# Patient Record
Sex: FEMALE | Race: WHITE | NOT HISPANIC OR LATINO | URBAN - METROPOLITAN AREA
[De-identification: names, ages, dates, MRNs, and addresses within clinical notes are randomized per-mention and may not be internally consistent; named-entity substitution may affect disease eponyms.]

---
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
## 2024-02-22 ENCOUNTER — TELEPHONE (OUTPATIENT)
Dept: OBSTETRICS AND GYNECOLOGY | Facility: CLINIC | Age: 19
End: 2024-02-22
Payer: COMMERCIAL

## 2024-03-01 ENCOUNTER — APPOINTMENT (OUTPATIENT)
Dept: NEUROLOGY | Facility: HOSPITAL | Age: 19
End: 2024-03-01
Payer: COMMERCIAL

## 2024-03-08 ENCOUNTER — TELEPHONE (OUTPATIENT)
Dept: PEDIATRICS | Facility: CLINIC | Age: 19
End: 2024-03-08
Payer: COMMERCIAL

## 2024-03-08 DIAGNOSIS — H10.30 ACUTE BACTERIAL CONJUNCTIVITIS, UNSPECIFIED LATERALITY: Primary | ICD-10-CM

## 2024-03-08 RX ORDER — CIPROFLOXACIN HYDROCHLORIDE 3 MG/ML
SOLUTION/ DROPS OPHTHALMIC
Qty: 5 ML | Refills: 0 | Status: SHIPPED | OUTPATIENT
Start: 2024-03-08

## 2024-08-05 ENCOUNTER — APPOINTMENT (OUTPATIENT)
Dept: OBSTETRICS AND GYNECOLOGY | Facility: CLINIC | Age: 19
End: 2024-08-05
Payer: COMMERCIAL

## 2024-08-21 ENCOUNTER — APPOINTMENT (OUTPATIENT)
Dept: OBSTETRICS AND GYNECOLOGY | Facility: CLINIC | Age: 19
End: 2024-08-21
Payer: COMMERCIAL

## 2024-08-21 VITALS
HEIGHT: 62 IN | WEIGHT: 119.6 LBS | SYSTOLIC BLOOD PRESSURE: 112 MMHG | DIASTOLIC BLOOD PRESSURE: 68 MMHG | BODY MASS INDEX: 22.01 KG/M2

## 2024-08-21 DIAGNOSIS — Z11.3 SCREENING EXAMINATION FOR STD (SEXUALLY TRANSMITTED DISEASE): ICD-10-CM

## 2024-08-21 DIAGNOSIS — Z01.419 VISIT FOR PELVIC EXAM: Primary | ICD-10-CM

## 2024-08-21 DIAGNOSIS — Z12.4 CERVICAL CANCER SCREENING: ICD-10-CM

## 2024-08-21 DIAGNOSIS — Z30.431 IUD CHECK UP: ICD-10-CM

## 2024-08-21 PROCEDURE — 1036F TOBACCO NON-USER: CPT | Performed by: OBSTETRICS & GYNECOLOGY

## 2024-08-21 PROCEDURE — 99395 PREV VISIT EST AGE 18-39: CPT | Performed by: OBSTETRICS & GYNECOLOGY

## 2024-08-21 PROCEDURE — 3008F BODY MASS INDEX DOCD: CPT | Performed by: OBSTETRICS & GYNECOLOGY

## 2024-08-21 ASSESSMENT — ENCOUNTER SYMPTOMS
CONSTITUTIONAL NEGATIVE: 1
GASTROINTESTINAL NEGATIVE: 1
PSYCHIATRIC NEGATIVE: 1
MUSCULOSKELETAL NEGATIVE: 1
CARDIOVASCULAR NEGATIVE: 1
RESPIRATORY NEGATIVE: 1
NEUROLOGICAL NEGATIVE: 1

## 2024-08-21 NOTE — PROGRESS NOTES
"Subjective   Madelin Banks is a 19 y.o. female who is here for a routine exam. Periods are regular every 28-30 days, lasting 7 days. Dysmenorrhea:none. Cyclic symptoms include none. No intermenstrual bleeding, spotting, or discharge. Patient denies dyspareunia.    Current contraception: IUD  History of abnormal Pap smear: no  Family history of uterine or ovarian cancer: no  Regular self breast exam: yes  History of abnormal mammogram: no  Family history of breast cancer: yes - maternal and paternal grandmothers, maternal aunts  History of abnormal lipids: no  Menstrual History:  OB History          0    Para   0    Term   0       0    AB   0    Living   0         SAB   0    IAB   0    Ectopic   0    Multiple   0    Live Births   0                  Patient's last menstrual period was 2024 (exact date).         Review of Systems   Constitutional: Negative.    Respiratory: Negative.     Cardiovascular: Negative.    Gastrointestinal: Negative.    Genitourinary: Negative.    Musculoskeletal: Negative.    Skin: Negative.    Neurological: Negative.    Psychiatric/Behavioral: Negative.         Objective   /68   Ht 1.575 m (5' 2\")   Wt 54.3 kg (119 lb 9.6 oz)   LMP 2024 (Exact Date)   BMI 21.88 kg/m²     General:   alert, appears stated age, and cooperative   Heart: regular rate and rhythm, S1, S2 normal, no murmur, click, rub or gallop   Lungs: clear to auscultation bilaterally   Abdomen: soft, non-tender, without masses or organomegaly   Pelvic: Deferred   Breast: No masses, skin changes, discoloration, tenderness, or nipple drainage bilaterally     Neck: Normal ROM. Thyroid normal size. No masses/tenderness     Lymph: No LAD   Psych: Normal mood/affect     Assessment/Plan   Problem List Items Addressed This Visit    None  Visit Diagnoses       Visit for pelvic exam    -  Primary    Cervical cancer screening        Screening examination for STD (sexually transmitted disease)        " IUD check up              1. Pelvic/breast exam wnl, counseled on breast awareness/self exam  2. Pap starting at 21.  3. IUD in place without complication  4. Counseled on safe sex practices including condom use. Offered screening for GN/CT, HIV, Syphilis. Patient declines    RTO 1 year  Jannie Zarate, DO

## 2024-08-21 NOTE — PATIENT INSTRUCTIONS
Routine Gynecologic Visit:  You were seen today for a routine gyn visit with normal findings on exam  You were not due for a pap smear today. You should still continue to get annual breast and pelvic exams in the office.  Continue self breast exams/monitoring at home and call for appointment in the office if there are ever masses, skin discoloration, dimpling/puckering of the skin, or nipple drainage when you are not pregnant  We discussed contraception today and your IUD is in the correct position. Continue to use condoms with any new partners to protect against STD's and have routine STD screening done at your gynecologic visits if you have had a new partner in the last year or have new symptoms of pelvic pain, discharge, vulvar rashes. STD screening was not done today  If you are having any gynecologic issues in the next year you should call the office. [phone] ([place]) or [phone] ([place])

## 2024-12-17 ENCOUNTER — APPOINTMENT (OUTPATIENT)
Dept: PEDIATRICS | Facility: CLINIC | Age: 19
End: 2024-12-17
Payer: COMMERCIAL

## 2024-12-17 VITALS
HEIGHT: 62 IN | WEIGHT: 115.1 LBS | SYSTOLIC BLOOD PRESSURE: 93 MMHG | HEART RATE: 75 BPM | DIASTOLIC BLOOD PRESSURE: 61 MMHG | BODY MASS INDEX: 21.18 KG/M2

## 2024-12-17 DIAGNOSIS — Z00.129 HEALTH CHECK FOR CHILD OVER 28 DAYS OLD: Primary | ICD-10-CM

## 2024-12-17 PROCEDURE — 3008F BODY MASS INDEX DOCD: CPT | Performed by: PEDIATRICS

## 2024-12-17 PROCEDURE — 90715 TDAP VACCINE 7 YRS/> IM: CPT | Performed by: PEDIATRICS

## 2024-12-17 PROCEDURE — 99395 PREV VISIT EST AGE 18-39: CPT | Performed by: PEDIATRICS

## 2024-12-17 PROCEDURE — 90471 IMMUNIZATION ADMIN: CPT | Performed by: PEDIATRICS

## 2024-12-17 SDOH — HEALTH STABILITY: MENTAL HEALTH: SMOKING IN HOME: 0

## 2024-12-17 ASSESSMENT — ENCOUNTER SYMPTOMS
SLEEP DISTURBANCE: 0
CONSTIPATION: 0

## 2024-12-17 NOTE — PROGRESS NOTES
Subjective   History was provided by the  patient .  Madelin Banks is a 19 y.o. female who is here for this well child visit.  Immunization History   Administered Date(s) Administered    DTaP vaccine, pediatric  (INFANRIX) 2004, 2004, 2004, 06/07/2005, 02/06/2009    Flu vaccine (IIV4), preservative free *Check age/dose* 11/24/2015, 11/07/2016, 10/10/2018, 11/03/2019, 11/17/2023    Flu vaccine, quadrivalent, no egg protein, age 6 month or greater (FLUCELVAX) 10/18/2021, 10/15/2022    HPV 9-valent vaccine (GARDASIL 9) 10/10/2018, 07/10/2019    Hepatitis A vaccine, pediatric/adolescent (HAVRIX, VAQTA) 07/10/2019    Hepatitis B vaccine, adult *Check Product/Dose* 2004, 2004, 2004    HiB PRP-OMP conjugate vaccine, pediatric (PEDVAXHIB) 2004, 2004, 2004, 06/07/2005    Influenza, Unspecified 2004, 2004, 10/26/2005, 11/21/2006, 10/22/2007, 11/08/2008    Influenza, live, intranasal, quadrivalent 09/08/2009, 10/23/2010, 09/24/2011, 11/02/2012, 10/26/2013, 10/25/2014    Influenza, seasonal, injectable 02/02/2018    MMR vaccine, subcutaneous (MMR II) 03/04/2005, 02/06/2009    Meningococcal ACWY vaccine (MENVEO) 07/22/2020    Meningococcal ACWY-D (Menactra) 4-valent conjugate vaccine 04/09/2015    Meningococcal B vaccine (BEXSERO) 07/22/2020, 07/29/2021    Novel Influenza-H1N1-09, nasal 11/20/2009, 12/22/2009    Pfizer COVID-19 vaccine, 12 years and older, (30mcg/0.3mL) (Comirnaty) 11/17/2023    Pfizer COVID-19 vaccine, bivalent, age 12 years and older (30 mcg/0.3 mL) 10/15/2022    Pfizer Gray Cap SARS-CoV-2 03/26/2022    Pfizer Purple Cap SARS-CoV-2 03/25/2021, 04/14/2021    Pneumococcal Conjugate PCV 7 2004, 2004, 2004, 06/07/2005    Poliovirus vaccine, subcutaneous (IPOL) 2004, 2004, 09/13/2005, 02/06/2009    Tdap vaccine, age 7 year and older (BOOSTRIX, ADACEL) 04/09/2015    Varicella vaccine, subcutaneous (VARIVAX) 06/07/2005,  02/06/2009     History of previous adverse reactions to immunizations? no  The following portions of the patient's history were reviewed by a provider in this encounter and updated as appropriate:       Well Child Assessment:  History provided by: patient. Madelin lives with her mother, father and brother. (psoriasis- using a topical foam- unsure of the name)     Nutrition  Food source: varied.   Dental  The patient has a dental home. The patient brushes teeth regularly. Last dental exam was less than 6 months ago.   Elimination  Elimination problems do not include constipation.   Sleep  There are no sleep problems.   Safety  There is no smoking in the home. Home has working smoke alarms? yes.   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 Child is doing well in school.   Social  After school activity: yoga, pilates.   Menses regular with IUD  +dating, 3 year relationship, +sexual activity  Social ETOH at school    Objective   There were no vitals filed for this visit.  Growth parameters are noted and are appropriate for age.  Physical Exam  Constitutional:       General: She is not in acute distress.  HENT:      Right Ear: Tympanic membrane normal.      Left Ear: Tympanic membrane normal.      Mouth/Throat:      Pharynx: Oropharynx is clear.   Eyes:      Conjunctiva/sclera: Conjunctivae normal.   Cardiovascular:      Rate and Rhythm: Normal rate.      Heart sounds: No murmur heard.  Pulmonary:      Effort: No respiratory distress.      Breath sounds: Normal breath sounds.   Abdominal:      Palpations: There is no mass.   Musculoskeletal:         General: Normal range of motion.   Lymphadenopathy:      Cervical: No cervical adenopathy.   Skin:     Findings: No rash.   Neurological:      General: No focal deficit present.      Mental Status: She is alert.         Assessment/Plan   Well adolescent.  1. Anticipatory guidance discussed.    2.  Weight management:   The patient was counseled regarding varied diet   .  3. Development: appropriate for age  4. No orders of the defined types were placed in this encounter.    5. Follow-up visit in 1 year for next well child visit, or sooner as needed.

## 2025-05-12 ENCOUNTER — APPOINTMENT (OUTPATIENT)
Dept: OBSTETRICS AND GYNECOLOGY | Facility: CLINIC | Age: 20
End: 2025-05-12
Payer: COMMERCIAL

## 2025-05-12 VITALS
SYSTOLIC BLOOD PRESSURE: 98 MMHG | DIASTOLIC BLOOD PRESSURE: 60 MMHG | HEIGHT: 61 IN | WEIGHT: 117 LBS | BODY MASS INDEX: 22.09 KG/M2

## 2025-05-12 DIAGNOSIS — Z12.4 CERVICAL CANCER SCREENING: ICD-10-CM

## 2025-05-12 DIAGNOSIS — Z01.419 WELL WOMAN EXAM: Primary | ICD-10-CM

## 2025-05-12 DIAGNOSIS — Z11.3 SCREENING EXAMINATION FOR STD (SEXUALLY TRANSMITTED DISEASE): ICD-10-CM

## 2025-05-12 DIAGNOSIS — Z30.431 IUD CHECK UP: ICD-10-CM

## 2025-05-12 PROCEDURE — 3008F BODY MASS INDEX DOCD: CPT | Performed by: OBSTETRICS & GYNECOLOGY

## 2025-05-12 PROCEDURE — 87491 CHLMYD TRACH DNA AMP PROBE: CPT

## 2025-05-12 PROCEDURE — 99395 PREV VISIT EST AGE 18-39: CPT | Performed by: OBSTETRICS & GYNECOLOGY

## 2025-05-12 PROCEDURE — 1036F TOBACCO NON-USER: CPT | Performed by: OBSTETRICS & GYNECOLOGY

## 2025-05-12 PROCEDURE — 87591 N.GONORRHOEAE DNA AMP PROB: CPT

## 2025-05-12 ASSESSMENT — ENCOUNTER SYMPTOMS
PSYCHIATRIC NEGATIVE: 1
CONSTITUTIONAL NEGATIVE: 1
CARDIOVASCULAR NEGATIVE: 1
NEUROLOGICAL NEGATIVE: 1
RESPIRATORY NEGATIVE: 1
GASTROINTESTINAL NEGATIVE: 1
MUSCULOSKELETAL NEGATIVE: 1

## 2025-05-12 NOTE — PATIENT INSTRUCTIONS
Routine Gynecologic Visit:  You were seen today for a routine gyn visit with normal findings on exam  You were due for a pap smear today. You should still continue to get annual breast and pelvic exams in the office.  Continue self breast exams/monitoring at home and call for appointment in the office if there are ever masses, skin discoloration, dimpling/puckering of the skin, or nipple drainage when you are not pregnant  We discussed contraception today, the staff will call to schedule your IUD replacement visit. Continue to use condoms with any new partners to protect against STD's and have routine STD screening done at your gynecologic visits if you have had a new partner in the last year or have new symptoms of pelvic pain, discharge, vulvar rashes. STD screening was done today, you will receive results by phone/[facility]  If you are having any gynecologic issues in the next year you should call the office. [phone] ([place]) or [phone] ([place])

## 2025-05-12 NOTE — PROGRESS NOTES
"Subjective   Madelin Banks is a 20 y.o. female who is here for a routine exam. Periods are regular every 28-30 days, lasting 9 days. Dysmenorrhea:none. Cyclic symptoms include none. No intermenstrual bleeding, spotting, or discharge. Denies dyspareunia.    Current contraception: IUD  History of abnormal Pap smear: no  Family history of uterine or ovarian cancer: no  Regular self breast exam: yes  History of abnormal mammogram: no  Family history of breast cancer: yes - Paternal/maternal grandmother, maternal aunt  History of abnormal lipids: no  Menstrual History:  OB History          0    Para   0    Term   0       0    AB   0    Living   0         SAB   0    IAB   0    Ectopic   0    Multiple   0    Live Births   0                  Patient's last menstrual period was 2025.         Review of Systems   Constitutional: Negative.    Respiratory: Negative.     Cardiovascular: Negative.    Gastrointestinal: Negative.    Genitourinary: Negative.    Musculoskeletal: Negative.    Neurological: Negative.    Psychiatric/Behavioral: Negative.         Objective   BP 98/60   Ht 1.549 m (5' 1\")   Wt 53.1 kg (117 lb)   LMP 2025   BMI 22.11 kg/m²     General:   alert, appears stated age, and cooperative   Heart: regular rate and rhythm, S1, S2 normal, no murmur, click, rub or gallop   Lungs: clear to auscultation bilaterally   Abdomen: soft, non-tender, without masses or organomegaly   Pelvic: Vulva normal in appearance without discoloration, rashes, lesions. Urethral meatus normal in appearance, without masses. Vagina is negative for blood, discharge, lesions. Uterus is small, mobile, non tender. There is no cervical motion tenderness, adnexal masses/tenderness. Perineum and anus with normal architecture and without rashes, lesions, discoloration.     Breast: No masses, skin changes, discoloration, tenderness, or nipple drainage bilaterally     Neck: Normal ROM. Thyroid normal size. No " masses/tenderness     Lymph: No LAD   Psych: Normal mood/affect     Assessment/Plan   Problem List Items Addressed This Visit    None  Visit Diagnoses         Well woman exam    -  Primary      Cervical cancer screening        Relevant Orders    THINPREP PAP      Screening examination for STD (sexually transmitted disease)        Relevant Orders    THINPREP PAP      IUD check up             1. Pelvic/breast exam wnl, counseled on breast awareness/self exam  2. Pap pending.  3. Patient would like to switch from [facility] to [facility] submitted  4. Counseled on safe sex practices including condom use. Offered screening for GN/CT, HIV, Syphilis. Patient consents to GC    RTO 1 year  Jannie Zarate DO

## 2025-05-13 LAB
C TRACH RRNA SPEC QL NAA+PROBE: NEGATIVE
N GONORRHOEA DNA SPEC QL PROBE+SIG AMP: NEGATIVE

## 2025-06-06 ENCOUNTER — TELEPHONE (OUTPATIENT)
Dept: OBSTETRICS AND GYNECOLOGY | Facility: CLINIC | Age: 20
End: 2025-06-06
Payer: COMMERCIAL

## 2025-06-06 LAB
CYTOLOGY CMNT CVX/VAG CYTO-IMP: NORMAL
HPV HR 12 DNA GENITAL QL NAA+PROBE: NEGATIVE
HPV HR GENOTYPES PNL CVX NAA+PROBE: NEGATIVE
HPV16 DNA SPEC QL NAA+PROBE: NEGATIVE
HPV18 DNA SPEC QL NAA+PROBE: NEGATIVE
LAB AP CONTRACEPTIVE HISTORY: NORMAL
LAB AP HPV GENOTYPE QUESTION: YES
LAB AP HPV HR: NORMAL
LAB AP PAP ADDITIONAL TESTS: NORMAL
LABORATORY COMMENT REPORT: NORMAL
LMP START DATE: NORMAL
PATH REPORT.TOTAL CANCER: NORMAL
RESIDENT REVIEW: NORMAL

## 2025-06-23 ENCOUNTER — APPOINTMENT (OUTPATIENT)
Dept: OBSTETRICS AND GYNECOLOGY | Facility: CLINIC | Age: 20
End: 2025-06-23
Payer: COMMERCIAL

## 2025-06-23 VITALS
WEIGHT: 120 LBS | HEIGHT: 61 IN | SYSTOLIC BLOOD PRESSURE: 112 MMHG | DIASTOLIC BLOOD PRESSURE: 70 MMHG | BODY MASS INDEX: 22.66 KG/M2

## 2025-06-23 DIAGNOSIS — Z32.02 PREGNANCY TEST NEGATIVE: ICD-10-CM

## 2025-06-23 DIAGNOSIS — Z30.433 ENCOUNTER FOR REMOVAL AND REINSERTION OF INTRAUTERINE CONTRACEPTIVE DEVICE: Primary | ICD-10-CM

## 2025-06-23 LAB — PREGNANCY TEST URINE, POC: NEGATIVE

## 2025-06-23 PROCEDURE — 58300 INSERT INTRAUTERINE DEVICE: CPT | Performed by: OBSTETRICS & GYNECOLOGY

## 2025-06-23 PROCEDURE — 81025 URINE PREGNANCY TEST: CPT | Performed by: OBSTETRICS & GYNECOLOGY

## 2025-06-23 PROCEDURE — 58301 REMOVE INTRAUTERINE DEVICE: CPT | Performed by: OBSTETRICS & GYNECOLOGY

## 2025-06-23 NOTE — PATIENT INSTRUCTIONS
IUD Insertion Visit:  You were seen in the office today for Mirena IUD insertion  An ultrasound was done in office today that confirmed correct positioning of your IUD in the uterine cavity  It is common to have vaginal bleeding and cramping following insertion. You can use Tylenol, Ibuprofen, or Naproxen to alleviate cramping. Bleeding can taper over several weeks.  If your IUD was placed during a menstrual period you can consider it immediately effective against pregnancy. If it was not placed during menstrual period it can take 2-3 weeks to be effective, and condoms should be used to protect against pregnancy during that time  IUD's protect against pregnancy but do not protect against STD's. Please remember to use condoms with any new partners and be checked for STD's if you are having vaginal symptoms or have had a new partner within the last year  You may choose to (but do not have to) check for your IUD strings by placing 1-2 clean finger into the vagina. Be careful not to pull/tug the strings as this can displace the IUD. Be careful when using menstrual cups or other similar products, or switch to pads/tampons, as vaginal collection devices can pull on the IUD strings during insertion/removal  If at any time you have symptoms of your IUD being displaced (new heavy bleeding, pelvic cramping, visualization/sensation of IUD strings at the vaginal opening), you should take a pregnancy test, call the office for an appointment, and use condoms to prevent pregnancy until another form of contraception is initiated. If your IUD falls out, rinse it off and bring it to the office in a plastic bag because the company that manufactures the device may be able to replace the device for free.  Make a follow up appointment in the office for 4-6 weeks. Continue to follow up annually for routine gynecologic exams  Call the office if you are having any problems with your IUD or if you have any questions regarding IUD use.  [phone] ([place]) or [phone] ([place])

## 2025-06-23 NOTE — PROGRESS NOTES
Patient ID: Madelin Banks is a 20 y.o. female.    IUD Insertion    Performed by: Jannie Zarate DO  Authorized by: Jannie Zarate DO    Procedure: IUD removal and insertion    Consent obtained by patient, parent, or legal power of  - including discussion of procedure risks and benefits, patient questions answered, and patient education provided: yes    Reason for removal: patient request    Strings visualized: yes    Cervix cleaned with: iodopovidone    IUD grasped by forceps: yes    IUD removed: yes    Date/Time of Removal:  6/23/2025 10:41 AM  Removed without complications: yes    IUD intact: yes    Pregnancy risk: reasonably certain the patient is not pregnant    Date/Time of Insertion:  6/23/2025 10:41 AM  Speculum placed in vagina: yes    Cervix cleaned and prepped: yes    Tenaculum/Allis/Ring Forceps applied to cervix: yes    Anesthesia used: yes    Local anesthesia:  Paracervical  Local anesthetic:  Lidocaine  Anesthetic strength (%):  1  Volume (mL):  3  Uterus sound depth (cm):  8  Strings trimmed to (cm):  3  Patient tolerated procedure well: yes    Inserted with ultrasound guidance: no    Transvaginal sono confirmed fundal placement: yes    Estimated blood loss (mL):  5  Intended removal date: 8 years    Jannie Zarate DO

## 2025-06-26 ENCOUNTER — OFFICE VISIT (OUTPATIENT)
Dept: PEDIATRICS | Facility: CLINIC | Age: 20
End: 2025-06-26
Payer: COMMERCIAL

## 2025-06-26 VITALS — BODY MASS INDEX: 21.68 KG/M2 | TEMPERATURE: 98 F | HEIGHT: 62 IN | WEIGHT: 117.8 LBS

## 2025-06-26 DIAGNOSIS — R14.0 ABDOMINAL DISTENSION: Primary | ICD-10-CM

## 2025-06-26 DIAGNOSIS — K59.00 CONSTIPATION, UNSPECIFIED CONSTIPATION TYPE: ICD-10-CM

## 2025-06-26 PROCEDURE — 3008F BODY MASS INDEX DOCD: CPT | Performed by: STUDENT IN AN ORGANIZED HEALTH CARE EDUCATION/TRAINING PROGRAM

## 2025-06-26 PROCEDURE — 99214 OFFICE O/P EST MOD 30 MIN: CPT | Performed by: STUDENT IN AN ORGANIZED HEALTH CARE EDUCATION/TRAINING PROGRAM

## 2025-06-26 NOTE — PROGRESS NOTES
"Subjective   Patient ID: Madelin Banks is a 20 y.o. female who presents for BLOOD WORK.  Today she is accompanied by mom, who serves as an independent historian.     Madelin frequently feels very boated after she eats  This happens most days, worsening towards the end of the day  These symptoms seem to have been worsening over the last year  Does not seem to be related to any specific foods  No abdominal pain, but does feel constant discomfort  No vomiting or diarrhea  No blood in the stool  Stools once every few days. Stools are occasionally hard, and difficult to push out. Has had blood when wiping on certain occasions.   Constipation has been an issue for many years.     No frequent urination  No heat/cold intolerance  Has psoriasis on scalp - has been following with dermatology.     Multiple paternal family members with celiac disease.         Objective   Temp 36.7 °C (98 °F)   Ht 1.568 m (5' 1.75\")   Wt 53.4 kg (117 lb 12.8 oz)   LMP 06/08/2025   BMI 21.72 kg/m²   BSA: 1.53 meters squared  Growth percentiles: Facility age limit for growth %marc is 20 years. Facility age limit for growth %marc is 20 years.     Physical Exam  Constitutional:       Appearance: Normal appearance.   HENT:      Mouth/Throat:      Mouth: Mucous membranes are moist.      Pharynx: Oropharynx is clear.   Cardiovascular:      Rate and Rhythm: Normal rate and regular rhythm.   Pulmonary:      Effort: Pulmonary effort is normal.      Breath sounds: Normal breath sounds.   Abdominal:      General: Abdomen is flat. Bowel sounds are normal. There is no distension.      Palpations: Abdomen is soft.      Tenderness: There is no abdominal tenderness. There is no guarding.   Skin:     Comments: Scaly patch on scalp   Neurological:      Mental Status: She is alert.               Assessment/Plan   20 y.o., otherwise healthy female presenting with bloating/constipation, worsening over the last year. Multiple family members with a diagnosis of " celiac disease. Will obtain blood work today. I will follow up with results and to discuss next steps.    Problem List Items Addressed This Visit    None      Neisha Knowles MD

## 2025-06-28 LAB
ERYTHROCYTE [DISTWIDTH] IN BLOOD BY AUTOMATED COUNT: 13.2 % (ref 11–15)
GLIADIN IGA SER IA-ACNC: <1 U/ML
GLIADIN IGG SER IA-ACNC: <1 U/ML
HCT VFR BLD AUTO: 38.2 % (ref 35–45)
HGB BLD-MCNC: 12 G/DL (ref 11.7–15.5)
IGA SERPL-MCNC: 173 MG/DL (ref 47–310)
MCH RBC QN AUTO: 25.6 PG (ref 27–33)
MCHC RBC AUTO-ENTMCNC: 31.4 G/DL (ref 32–36)
MCV RBC AUTO: 81.6 FL (ref 80–100)
PLATELET # BLD AUTO: 304 THOUSAND/UL (ref 140–400)
PMV BLD REES-ECKER: 10.3 FL (ref 7.5–12.5)
RBC # BLD AUTO: 4.68 MILLION/UL (ref 3.8–5.1)
TSH SERPL-ACNC: 1.94 MIU/L
TTG IGA SER-ACNC: <1 U/ML
TTG IGG SER-ACNC: <1 U/ML
WBC # BLD AUTO: 4.9 THOUSAND/UL (ref 3.8–10.8)

## 2025-07-21 ENCOUNTER — APPOINTMENT (OUTPATIENT)
Dept: OBSTETRICS AND GYNECOLOGY | Facility: CLINIC | Age: 20
End: 2025-07-21
Payer: COMMERCIAL

## 2025-07-21 VITALS — SYSTOLIC BLOOD PRESSURE: 114 MMHG | DIASTOLIC BLOOD PRESSURE: 62 MMHG | WEIGHT: 120 LBS | BODY MASS INDEX: 22.13 KG/M2

## 2025-07-21 DIAGNOSIS — Z30.431 IUD CHECK UP: Primary | ICD-10-CM

## 2025-07-21 PROBLEM — G62.89 AXONAL SENSORIMOTOR NEUROPATHY: Status: ACTIVE | Noted: 2023-10-10

## 2025-07-21 PROBLEM — K92.1 HEMATOCHEZIA: Status: RESOLVED | Noted: 2025-07-21 | Resolved: 2025-07-21

## 2025-07-21 PROBLEM — J35.2 HYPERTROPHY OF ADENOIDS: Status: RESOLVED | Noted: 2025-07-21 | Resolved: 2025-07-21

## 2025-07-21 PROBLEM — R22.1 MASS OF NECK: Status: RESOLVED | Noted: 2025-07-21 | Resolved: 2025-07-21

## 2025-07-21 PROBLEM — K11.9 DISORDER OF SALIVARY GLAND: Status: RESOLVED | Noted: 2025-07-21 | Resolved: 2025-07-21

## 2025-07-21 PROCEDURE — 1036F TOBACCO NON-USER: CPT | Performed by: OBSTETRICS & GYNECOLOGY

## 2025-07-21 PROCEDURE — 99213 OFFICE O/P EST LOW 20 MIN: CPT | Performed by: OBSTETRICS & GYNECOLOGY

## 2025-07-21 RX ORDER — LEVONORGESTREL 52 MG/1
1 INTRAUTERINE DEVICE INTRAUTERINE ONCE
COMMUNITY

## 2025-07-21 ASSESSMENT — ENCOUNTER SYMPTOMS
PSYCHIATRIC NEGATIVE: 1
GASTROINTESTINAL NEGATIVE: 1
CONSTITUTIONAL NEGATIVE: 1
CARDIOVASCULAR NEGATIVE: 1
MUSCULOSKELETAL NEGATIVE: 1
NEUROLOGICAL NEGATIVE: 1
RESPIRATORY NEGATIVE: 1

## 2025-07-21 NOTE — PROGRESS NOTES
Subjective   Patient ID: Madelin Banks is a 20 y.o. female who presents for iud check.  HPI    Patient presents for follow up of Mirena IUD insertion. She is doing well and bleeding is slowing with occasional increases. She denies pelvic pain, discharge, itching, burning, rash, dysuria, odor. She has been sexually active since insertion and neither her nor her partner felt the strings.    Review of Systems   Constitutional: Negative.    Respiratory: Negative.     Cardiovascular: Negative.    Gastrointestinal: Negative.    Genitourinary: Negative.    Musculoskeletal: Negative.    Neurological: Negative.    Psychiatric/Behavioral: Negative.         Objective   Visit Vitals  /62   Wt 54.4 kg (120 lb)   LMP 07/03/2025 (Exact Date)   BMI 22.13 kg/m²   OB Status IUD   Smoking Status Never   BSA 1.54 m²       Physical Exam  Constitutional:       Appearance: Normal appearance.   Pulmonary:      Effort: Pulmonary effort is normal.   Genitourinary:     Comments: Vulva normal in appearance without discoloration, rashes, lesions. Vagina is negative discharge, lesions. Small amount brown blood in vault. Uterus is small, mobile, non tender. There is no cervical motion tenderness, adnexal masses/tenderness. IUD strings are visualized.        Neurological:      Mental Status: She is alert.     Psychiatric:         Mood and Affect: Mood normal.         Behavior: Behavior normal.         Assessment/Plan   Problem List Items Addressed This Visit    None  Visit Diagnoses         Codes      IUD check up    -  Primary Z30.431          Discussed findings on exam  IUD in place without complication  Strings not trimmed today  Patient may rely on IUD for BC  Precautions given  RTO 1 year PAR[place] Zarate DO 07/21/25 10:45 AM

## 2025-07-21 NOTE — PATIENT INSTRUCTIONS
IUD Follow Up:  You were seen in the office today for follow up to your Mirena IUD insertion  Your IUD strings were visualized on exam and you clinical picture is appropriate for this stage post insertion  Bleeding should continue to taper and stop in the next few weeks and then you may have either light periods or stop having periods for hormonal IUD's. If you had a Paraguard IUD placed you will continue to have periods on your normal cycle, and they may be heavier than you are used to.  IUD's protect against pregnancy but do not protect against STD's. Please remember to use condoms with any new partners and be checked for STD's if you are having vaginal symptoms or have had a new partner within the last year  If at any time you have symptoms of your IUD being displaced (new heavy bleeding, pelvic cramping, visualization/sensation of IUD strings at the vaginal opening), you should take a pregnancy test, call the office for an appointment, and use condoms to prevent pregnancy until another form of contraception is initiated. If your IUD falls out, rinse it off and bring it to the office in a plastic bag because the company that manufactures the device may be able to replace the device for free.  Continue to follow up annually for routine gynecologic exams  Call the office if you are having any problems with your IUD or if you have any questions regarding IUD use. [phone] ([place]) or [phone] ([place])